# Patient Record
Sex: FEMALE | URBAN - METROPOLITAN AREA
[De-identification: names, ages, dates, MRNs, and addresses within clinical notes are randomized per-mention and may not be internally consistent; named-entity substitution may affect disease eponyms.]

---

## 2022-02-15 ENCOUNTER — OFFICE VISIT (OUTPATIENT)
Dept: URBAN - METROPOLITAN AREA CLINIC 23 | Facility: CLINIC | Age: 77
End: 2022-02-15
Payer: COMMERCIAL

## 2022-02-15 DIAGNOSIS — H55.00 NYSTAGMUS: Primary | ICD-10-CM

## 2022-02-15 PROCEDURE — 99204 OFFICE O/P NEW MOD 45 MIN: CPT | Performed by: OPTOMETRIST

## 2022-02-15 ASSESSMENT — KERATOMETRY
OD: 44.75
OS: 44.88

## 2022-02-15 ASSESSMENT — INTRAOCULAR PRESSURE
OS: 19
OD: 19

## 2022-02-15 NOTE — IMPRESSION/PLAN
Impression: Nystagmus: H55.00 Bilateral. Condition: will continue to monitor. Plan: Discussed findings. Patient has nystagmus inferior gaze. Recommend consult with neuro ophthalmology for further evaluation. Call if symptoms worsen.  Gave referral information for Dr. Tayla Huynh at [facility]